# Patient Record
Sex: MALE | Race: WHITE | Employment: FULL TIME | URBAN - METROPOLITAN AREA
[De-identification: names, ages, dates, MRNs, and addresses within clinical notes are randomized per-mention and may not be internally consistent; named-entity substitution may affect disease eponyms.]

---

## 2017-01-13 ENCOUNTER — HOSPITAL ENCOUNTER (OUTPATIENT)
Dept: ONCOLOGY | Age: 62
Discharge: HOME | End: 2017-01-13
Attending: INTERNAL MEDICINE | Admitting: INTERNAL MEDICINE

## 2017-01-13 VITALS
RESPIRATION RATE: 18 BRPM | SYSTOLIC BLOOD PRESSURE: 160 MMHG | TEMPERATURE: 98 F | DIASTOLIC BLOOD PRESSURE: 97 MMHG | HEART RATE: 59 BPM

## 2017-01-13 DIAGNOSIS — D61.810 PANCYTOPENIA DUE TO CHEMOTHERAPY (HCC): ICD-10-CM

## 2017-01-13 LAB
BLOOD BANK DISPENSE STATUS: NORMAL
BLOOD BANK PRODUCT CODE: NORMAL
BPU ID: NORMAL
DESCRIPTION BLOOD BANK: NORMAL

## 2017-01-13 RX ORDER — SODIUM CHLORIDE 9 MG/ML
INJECTION, SOLUTION INTRAVENOUS CONTINUOUS
Status: ACTIVE | OUTPATIENT
Start: 2017-01-13 | End: 2017-01-14

## 2017-01-13 RX ORDER — ACETAMINOPHEN 325 MG/1
650 TABLET ORAL ONCE
Status: COMPLETED | OUTPATIENT
Start: 2017-01-13 | End: 2017-01-13

## 2017-01-13 RX ORDER — HEPARIN SODIUM (PORCINE) LOCK FLUSH IV SOLN 100 UNIT/ML 100 UNIT/ML
500 SOLUTION INTRAVENOUS PRN
Status: CANCELLED | OUTPATIENT
Start: 2017-01-13

## 2017-01-13 RX ORDER — SODIUM CHLORIDE 0.9 % (FLUSH) 0.9 %
10 SYRINGE (ML) INJECTION PRN
Status: ACTIVE | OUTPATIENT
Start: 2017-01-13 | End: 2017-01-14

## 2017-01-13 RX ORDER — HEPARIN SODIUM (PORCINE) LOCK FLUSH IV SOLN 100 UNIT/ML 100 UNIT/ML
500 SOLUTION INTRAVENOUS PRN
Status: DISPENSED | OUTPATIENT
Start: 2017-01-13 | End: 2017-01-14

## 2017-01-13 RX ORDER — SODIUM CHLORIDE 9 MG/ML
INJECTION, SOLUTION INTRAVENOUS CONTINUOUS
Status: CANCELLED | OUTPATIENT
Start: 2017-01-13

## 2017-01-13 RX ORDER — ACETAMINOPHEN 325 MG/1
650 TABLET ORAL ONCE
Status: CANCELLED | OUTPATIENT
Start: 2017-01-13 | End: 2017-01-13

## 2017-01-13 RX ORDER — SODIUM CHLORIDE 0.9 % (FLUSH) 0.9 %
20 SYRINGE (ML) INJECTION PRN
Status: ACTIVE | OUTPATIENT
Start: 2017-01-13 | End: 2017-01-14

## 2017-01-13 RX ORDER — SODIUM CHLORIDE 0.9 % (FLUSH) 0.9 %
20 SYRINGE (ML) INJECTION PRN
Status: CANCELLED | OUTPATIENT
Start: 2017-01-13

## 2017-01-13 RX ORDER — SODIUM CHLORIDE 0.9 % (FLUSH) 0.9 %
10 SYRINGE (ML) INJECTION PRN
Status: CANCELLED | OUTPATIENT
Start: 2017-01-13

## 2017-01-13 RX ADMIN — SODIUM CHLORIDE: 9 INJECTION, SOLUTION INTRAVENOUS at 09:51

## 2017-01-13 RX ADMIN — ACETAMINOPHEN 650 MG: 325 TABLET ORAL at 09:49

## 2017-01-13 RX ADMIN — HEPARIN SODIUM (PORCINE) LOCK FLUSH IV SOLN 100 UNIT/ML 500 UNITS: 100 SOLUTION at 10:26

## 2017-01-13 RX ADMIN — Medication 20 ML: at 10:26

## 2017-01-13 ASSESSMENT — PAIN SCALES - GENERAL: PAINLEVEL_OUTOF10: 0

## 2017-01-30 ENCOUNTER — HOSPITAL ENCOUNTER (OUTPATIENT)
Dept: CT IMAGING | Age: 62
Discharge: HOME | End: 2017-01-30
Attending: INTERNAL MEDICINE | Admitting: INTERNAL MEDICINE

## 2017-01-30 DIAGNOSIS — C83.33 DIFFUSE LARGE B-CELL LYMPHOMA OF INTRA-ABDOMINAL LYMPH NODES (HCC): ICD-10-CM

## 2017-03-27 ENCOUNTER — HOSPITAL ENCOUNTER (OUTPATIENT)
Dept: CT IMAGING | Age: 62
Discharge: HOME | End: 2017-03-27
Attending: INTERNAL MEDICINE | Admitting: INTERNAL MEDICINE

## 2017-03-27 DIAGNOSIS — C83.33 DIFFUSE LARGE B-CELL LYMPHOMA OF INTRA-ABDOMINAL LYMPH NODES (HCC): ICD-10-CM

## 2017-03-27 DIAGNOSIS — R06.02 SHORTNESS OF BREATH: ICD-10-CM

## 2017-05-12 ENCOUNTER — HOSPITAL ENCOUNTER (OUTPATIENT)
Dept: CT IMAGING | Age: 62
Discharge: HOME | End: 2017-05-12
Attending: INTERNAL MEDICINE | Admitting: INTERNAL MEDICINE

## 2017-05-12 DIAGNOSIS — R06.02 SHORTNESS OF BREATH: ICD-10-CM

## 2017-05-12 DIAGNOSIS — C83.33 DIFFUSE LARGE B-CELL LYMPHOMA OF INTRA-ABDOMINAL LYMPH NODES (HCC): ICD-10-CM

## 2017-06-16 ENCOUNTER — HOSPITAL ENCOUNTER (OUTPATIENT)
Dept: NON INVASIVE DIAGNOSTICS | Age: 62
Discharge: HOME | End: 2017-06-16
Attending: INTERNAL MEDICINE | Admitting: INTERNAL MEDICINE

## 2017-06-16 DIAGNOSIS — R06.02 SHORTNESS OF BREATH: ICD-10-CM

## 2017-06-16 LAB
LV EF: 50 %
LVEF MODALITY: NORMAL

## 2017-07-19 ENCOUNTER — HOSPITAL ENCOUNTER (OUTPATIENT)
Dept: CT IMAGING | Age: 62
Discharge: HOME | End: 2017-07-19
Attending: INTERNAL MEDICINE | Admitting: INTERNAL MEDICINE

## 2017-07-19 DIAGNOSIS — C83.33 DIFFUSE LARGE B-CELL LYMPHOMA OF INTRA-ABDOMINAL LYMPH NODES (HCC): ICD-10-CM

## 2017-07-19 DIAGNOSIS — R06.02 SHORTNESS OF BREATH: ICD-10-CM

## 2017-09-11 ENCOUNTER — TELEPHONE (OUTPATIENT)
Dept: CT IMAGING | Age: 62
End: 2017-09-11

## 2017-09-13 ENCOUNTER — HOSPITAL ENCOUNTER (OUTPATIENT)
Dept: CT IMAGING | Age: 62
Discharge: HOME | End: 2017-09-08

## 2017-09-13 DIAGNOSIS — R06.02 SHORTNESS OF BREATH: ICD-10-CM

## 2017-09-13 DIAGNOSIS — C83.33 DIFFUSE LARGE B-CELL LYMPHOMA OF INTRA-ABDOMINAL LYMPH NODES (HCC): ICD-10-CM

## 2017-09-27 ENCOUNTER — HOSPITAL ENCOUNTER (OUTPATIENT)
Dept: CT IMAGING | Age: 62
Discharge: HOME | End: 2017-09-27
Attending: INTERNAL MEDICINE | Admitting: INTERNAL MEDICINE

## 2017-09-27 DIAGNOSIS — C83.33 DIFFUSE LARGE B-CELL LYMPHOMA OF INTRA-ABDOMINAL LYMPH NODES (HCC): ICD-10-CM

## 2017-10-30 ENCOUNTER — HOSPITAL ENCOUNTER (OUTPATIENT)
Dept: VASCULAR LAB | Age: 62
Discharge: HOME | End: 2017-10-30
Attending: STUDENT IN AN ORGANIZED HEALTH CARE EDUCATION/TRAINING PROGRAM | Admitting: STUDENT IN AN ORGANIZED HEALTH CARE EDUCATION/TRAINING PROGRAM

## 2017-10-30 DIAGNOSIS — M79.605 PAIN AND SWELLING OF LEFT LOWER EXTREMITY: ICD-10-CM

## 2017-10-30 DIAGNOSIS — R60.0 EDEMA OF LEFT LOWER EXTREMITY: Primary | ICD-10-CM

## 2017-10-30 DIAGNOSIS — M79.89 PAIN AND SWELLING OF LEFT LOWER EXTREMITY: ICD-10-CM

## 2017-11-14 ENCOUNTER — HOSPITAL ENCOUNTER (OUTPATIENT)
Dept: CT IMAGING | Age: 62
Discharge: HOME | End: 2017-11-14
Attending: INTERNAL MEDICINE | Admitting: INTERNAL MEDICINE

## 2017-11-14 DIAGNOSIS — C83.33 DIFFUSE HISTIOCYTIC NODULAR LYMPHOMA OF ABDOMEN (HCC): ICD-10-CM

## 2017-11-14 DIAGNOSIS — C83.33 DIFFUSE LARGE B-CELL LYMPHOMA OF INTRA-ABDOMINAL LYMPH NODES (HCC): ICD-10-CM

## 2017-11-29 ENCOUNTER — TELEPHONE (OUTPATIENT)
Dept: SURGERY | Age: 62
End: 2017-11-29

## 2017-11-29 NOTE — TELEPHONE ENCOUNTER
Received Port Request from 's office needed on 12/7/17. Called patient, spoke to patient's spouse to schedule Port Placement for [facility] 12/7/17 to arrive @ 10:30am main entrance [facility]. Gave all verbal instructions. Medications to be stopped 5 days before surgery: Xarelto, patient is aware and will call  for instructions on stopping. Faxed confirmation letter to 's office.

## 2017-12-05 NOTE — PROGRESS NOTES
the order may or may not be in effect during this procedure. I give my doctor permission to give me blood or blood products. I understand that there are risks with receiving blood such as hepatitis, AIDS, fever, or allergic reaction. I acknowledge that the risks, benefits, and alternatives of this treatment have been explained to me and that no express or implied warranty has been given by the hospital, any blood bank, or any person or entity as to the blood or blood components transfused. At the discretion of my doctor, I agree to allow observers, equipment/product representatives and allow photographing, and/or televising of the procedure, provided my name or identity is maintained confidentially. I agree the hospital may dispose of or use for scientific or educational purposes any tissue, fluid, or body parts which may be removed.     ________________________________Date________Time______ am/pm  ([facility])  Patient or Signature of Closest Relative or Legal Guardian    ________________________________Date________Time______am/pm      Page 1 of  1  Witness

## 2017-12-06 ENCOUNTER — HOSPITAL ENCOUNTER (OUTPATIENT)
Dept: PREADMISSION TESTING | Age: 62
Discharge: HOME | End: 2017-12-06
Admitting: SURGERY

## 2017-12-06 VITALS — HEIGHT: 70 IN | BODY MASS INDEX: 32.64 KG/M2 | WEIGHT: 228 LBS

## 2017-12-06 RX ORDER — CHLORHEXIDINE GLUCONATE 0.12 MG/ML
15 RINSE ORAL 2 TIMES DAILY
Status: CANCELLED | OUTPATIENT
Start: 2017-12-06

## 2017-12-06 RX ORDER — CHLORHEXIDINE GLUCONATE 0.12 MG/ML
15 RINSE ORAL 2 TIMES DAILY
Status: CANCELLED | OUTPATIENT
Start: 2017-12-06 | End: 2017-12-07

## 2017-12-06 NOTE — ANESTHESIA PRE-OP
ANESTHESIA PRE-OP NOTE    NAME: Melanie Madden  : 1955  AGE: 60 y.o.  MED. REC. #: [id number]  DOS: 17  TIME: 12:30     PROCEDURE: INSERTION SINGLE LUMEN PORT-A-CATHETER   SURGEON: [place] Nena     ALLERGIES: Lansoprazole and Penicillins     HT: 5'10\"   WT: 220 lbs    MEDICATIONS:   ACYCLOVIR (ZOVIRAX) 800 MG TABLET   DIPHENHYDRAMINE (BENADRYL) 25 MG TABLET   DOCUSATE SODIUM (COLACE) 100 MG CAPSULE   HYDROCORTISONE  RECTAL CREAM   ONDANSETRON (ZOFRAN) 4 MG TABLET   OXYCODONE   PROCHLORPERAZINE (COMPAZINE)   RANITIDINE (ZANTAC) 150 MG TABLET   RIVAROXABAN (XARELTO)-->LAST DOSE: 17   SENNA (SENOKOT) 8.6 MG TABS TABLET   SULFAMETHOXAZOLE-TRIMETHOPRIM (BACTRIM)    ASA STATUS: 3  NPO since: midnight   Patient identified/chart reviewed: yes     PSH:   Appendectomy; Chaplin tooth extraction; Abdomen surgery; Colonoscopy; and fracture surgery. PERSONAL/FAMILY ANESTHESIA PROBLEMS: ( - )       CV: HTN; ECHO (2017)-->EF=55% PULMONARY: POSSIBLE SLEEP APNEA-->SNORES    GI/HEPATIC: MILD GERD  ENDOCRINE: ( - ) DM   NEURO/PSYCH: CONGENITAL PYLORIC STENOSIS  (SURGICALLY REPAIRED) : H/O RENAL DYSFUNCTION--> BUN / CREATININE ESSENTIALLY NORMAL (OCT. 2017)   MUSCULOSKELETAL: LOW BACK PAIN OTHER: HYPERCHOLESTEROLEMIA; H/O DVT & INFERIOR VENA CAVAL THROMBOSIS   HEME/ONC: LYMPHOMA; HCT=39.2 AND PLATELETS 976 K (OCT. 2017) COMMENTS:     AIRWAY ASSESSMENT: POSSIBLE SLEEP APNEA / RETROGNATHIC  MALLAMPATI: 3 DENTITION: INTACT ROM: FULL     ANESTHETIC PLAN: MAC with IV induction (general prn)   CONSENT: Risks/benefits/options/questions discussed with patient and/or patient representative. Consent obtained: Justo Boateng M.D.  2017  11:51 AM

## 2017-12-07 ENCOUNTER — HOSPITAL ENCOUNTER (OUTPATIENT)
Dept: SURGERY | Age: 62
Discharge: HOME | End: 2017-12-07
Attending: SURGERY | Admitting: SURGERY

## 2017-12-07 VITALS
HEART RATE: 57 BPM | OXYGEN SATURATION: 98 % | WEIGHT: 228 LBS | HEIGHT: 70 IN | SYSTOLIC BLOOD PRESSURE: 135 MMHG | DIASTOLIC BLOOD PRESSURE: 85 MMHG | TEMPERATURE: 97.7 F | BODY MASS INDEX: 32.64 KG/M2 | RESPIRATION RATE: 16 BRPM

## 2017-12-07 DIAGNOSIS — Z95.828 PORTACATH IN PLACE: ICD-10-CM

## 2017-12-07 PROCEDURE — 36561 INSERT TUNNELED CV CATH: CPT | Performed by: SURGERY

## 2017-12-07 PROCEDURE — 77001 FLUOROGUIDE FOR VEIN DEVICE: CPT | Performed by: SURGERY

## 2017-12-07 RX ORDER — DIPHENHYDRAMINE HYDROCHLORIDE 50 MG/ML
INJECTION INTRAMUSCULAR; INTRAVENOUS
Status: DISCONTINUED
Start: 2017-12-07 | End: 2017-12-08 | Stop reason: HOSPADM

## 2017-12-07 RX ORDER — ONDANSETRON 2 MG/ML
4 INJECTION INTRAMUSCULAR; INTRAVENOUS
Status: ACTIVE | OUTPATIENT
Start: 2017-12-07 | End: 2017-12-07

## 2017-12-07 RX ORDER — FENTANYL CITRATE 50 UG/ML
25 INJECTION, SOLUTION INTRAMUSCULAR; INTRAVENOUS EVERY 5 MIN PRN
Status: DISCONTINUED | OUTPATIENT
Start: 2017-12-07 | End: 2017-12-08 | Stop reason: HOSPADM

## 2017-12-07 RX ORDER — SODIUM CHLORIDE 0.9 % (FLUSH) 0.9 %
10 SYRINGE (ML) INJECTION EVERY 12 HOURS SCHEDULED
Status: DISCONTINUED | OUTPATIENT
Start: 2017-12-07 | End: 2017-12-08 | Stop reason: HOSPADM

## 2017-12-07 RX ORDER — SODIUM CHLORIDE 0.9 % (FLUSH) 0.9 %
10 SYRINGE (ML) INJECTION PRN
Status: DISCONTINUED | OUTPATIENT
Start: 2017-12-07 | End: 2017-12-08 | Stop reason: HOSPADM

## 2017-12-07 RX ORDER — LABETALOL HYDROCHLORIDE 5 MG/ML
5 INJECTION, SOLUTION INTRAVENOUS EVERY 5 MIN PRN
Status: DISCONTINUED | OUTPATIENT
Start: 2017-12-07 | End: 2017-12-08 | Stop reason: HOSPADM

## 2017-12-07 RX ORDER — DIPHENHYDRAMINE HYDROCHLORIDE 50 MG/ML
25 INJECTION INTRAMUSCULAR; INTRAVENOUS ONCE
Status: COMPLETED | OUTPATIENT
Start: 2017-12-07 | End: 2017-12-07

## 2017-12-07 RX ORDER — HYDRALAZINE HYDROCHLORIDE 20 MG/ML
5 INJECTION INTRAMUSCULAR; INTRAVENOUS EVERY 5 MIN PRN
Status: DISCONTINUED | OUTPATIENT
Start: 2017-12-07 | End: 2017-12-08 | Stop reason: HOSPADM

## 2017-12-07 RX ORDER — SODIUM CHLORIDE, SODIUM LACTATE, POTASSIUM CHLORIDE, CALCIUM CHLORIDE 600; 310; 30; 20 MG/100ML; MG/100ML; MG/100ML; MG/100ML
INJECTION, SOLUTION INTRAVENOUS CONTINUOUS
Status: DISCONTINUED | OUTPATIENT
Start: 2017-12-07 | End: 2017-12-08 | Stop reason: HOSPADM

## 2017-12-07 RX ORDER — SODIUM CHLORIDE, SODIUM LACTATE, POTASSIUM CHLORIDE, CALCIUM CHLORIDE 600; 310; 30; 20 MG/100ML; MG/100ML; MG/100ML; MG/100ML
125 INJECTION, SOLUTION INTRAVENOUS CONTINUOUS
Status: DISCONTINUED | OUTPATIENT
Start: 2017-12-07 | End: 2017-12-08 | Stop reason: HOSPADM

## 2017-12-07 RX ORDER — LIDOCAINE HYDROCHLORIDE 10 MG/ML
1 INJECTION, SOLUTION EPIDURAL; INFILTRATION; INTRACAUDAL; PERINEURAL
Status: ACTIVE | OUTPATIENT
Start: 2017-12-07 | End: 2017-12-07

## 2017-12-07 RX ADMIN — DIPHENHYDRAMINE HYDROCHLORIDE 25 MG: 50 INJECTION INTRAMUSCULAR; INTRAVENOUS at 13:26

## 2017-12-07 RX ADMIN — HYDRALAZINE HYDROCHLORIDE 5 MG: 20 INJECTION INTRAMUSCULAR; INTRAVENOUS at 14:12

## 2017-12-07 RX ADMIN — SODIUM CHLORIDE, SODIUM LACTATE, POTASSIUM CHLORIDE, CALCIUM CHLORIDE 125 ML/HR: 600; 310; 30; 20 INJECTION, SOLUTION INTRAVENOUS at 11:04

## 2017-12-07 ASSESSMENT — PAIN SCALES - GENERAL: PAINLEVEL_OUTOF10: 0

## 2017-12-07 ASSESSMENT — PAIN - FUNCTIONAL ASSESSMENT: PAIN_FUNCTIONAL_ASSESSMENT: 0-10

## 2017-12-07 NOTE — PROGRESS NOTES
PACU Transfer to [facility]    Vitals:    12/07/17 1430   BP: (!) 157/86   Pulse: 63   Resp: 15   Temp: 97 °F (36.1 °C)   SpO2: 96%         Intake/Output Summary (Last 24 hours) at 12/07/17 1440  Last data filed at 12/07/17 1435   Gross per 24 hour   Intake              739 ml   Output                0 ml   Net              739 ml       Pain assessment:  none  Pain Level: 0    Patient transferred to care of [facility] RN.    12/7/2017 2:40 PM

## 2017-12-07 NOTE — BRIEF OP NOTE
Brief Postoperative Note    Lum Oyster  YOB: 1955  [id number]    Pre-operative Diagnosis: Diffuse large B cell lymphoma     Post-operative Diagnosis: Same    Procedure: insertion of left subclavian single lumen port a catheter under fluoroscopic guidance     Anesthesia: MAC    Surgeons/Assistants: Dr. Rob Trinh     Estimated Blood Loss: less than 50     Complications: None    Specimens: Was Not Obtained      Electronically signed by Addis Cottrell MD on 12/7/2017 at 1:02 -2547

## 2017-12-07 NOTE — H&P
Rachele Joyce    [id number]    [facility] Same Day Surgery Update H & P  Department of General Surgery   Surgical Service   CNP Pre-operative History and Physical  Last H & P within the last 30 days. DIAGNOSIS:   B-CELL LYMPHOMA    PROCEDURE:  Insertion Single Lumen Port-A-Cath ( Leave Accessed)      HISTORY OF PRESENT ILLNESS: Pt. Is a 60 y.o. Male who is here for surgical intervention for Insertion Single Lumen Port-A-Cath for Central Venous Blood access for chemotherapy treatment of B-Cell Lymphoma. Please see initial H & P     Past Medical History:        Diagnosis Date    Allergic     Cancer ([address] 2015, 2017    Lymphoma    Chronic kidney disease     Disease of blood and blood forming organ     History of blood transfusion     Hx of blood clots 04/2015    groin    Hyperlipidemia     Hypertension     Pyloric stenosis, congenital     as a baby - surgically repaired     Past Surgical History:        Procedure Laterality Date    ABDOMEN SURGERY      pyloric stenosis     APPENDECTOMY      COLONOSCOPY      FRACTURE SURGERY      broken heal    WISDOM TOOTH EXTRACTION       Past Social History:  Social History     Social History    Marital status:      Spouse name: Noralyn Cousin    Number of children: 2    Years of education: N/A     Occupational History    [address] [place] [address]     Social History Main Topics    Smoking status: Never Smoker    Smokeless tobacco: Never Used    Alcohol use Yes      Comment: socially    Drug use: No    Sexual activity: Not Currently     Partners: Female      Comment:  - monogamous     Other Topics Concern    None     Social History Narrative    None         Medications Prior to Admission:      Prior to Admission medications    Medication Sig Start Date End Date Taking? Authorizing Provider   oxyCODONE (OXYCONTIN) 20 MG extended release tablet Take 1 tablet by mouth every 12 hours .  Earliest Fill Date: 8/5/17 8/5/17  Yes

## 2017-12-07 NOTE — PROGRESS NOTES
Ambulatory Surgery/Procedure Discharge Note    Vitals:    12/07/17 1538   BP: 135/85   Pulse:    Resp:    Temp:    SpO2:        In: -   Out: 300 [Urine:300]    Pain assessment:  None  Port a cath is accessed, Patient to see oncologist tomorrow. Pain Level: 0    Patient discharged to home/self care.  Patient discharged via wheel chair by transporter to waiting family/S.O.       12/7/2017 3:43 PM

## 2017-12-07 NOTE — PROGRESS NOTES
Pt admitted to PACU 9 from OR post INSERTION SINGLE LUMEN PORT-A-CATH LEFT SUBCLAVIAN ([facility]) per Dr. Natacha Huang. Arrives in stable condition. PACU monitoring devices in place. Report received at bedside from CRNA, no intraoperative complications. Pt denies pain. Complaining of itching. Order obtained for benadryl.

## 2017-12-07 NOTE — PROGRESS NOTES
prior to IV insertion, hair clipped at surgical site if needed  [x] Pre op antibiotic, if ordered    Patient Safety  [x] Patient identification  [x] Site verification (See Universal Protocol Checklist)  [x] General Safety precautions  [x] Side rails up, bed/stretcher in low position, wheels locked  [x] Call light within reach  [x] Patient instructed to call for assistance prior to getting out of bed    Instructions - Discharge planning for Outpatients  [x] Patient / significant other voices understanding of home care and follow up procedures.   Anticipated Special Needs upon discharge:  [] Cooling device  [] Crutches  [] Walker  [] Wound Support device   []Drain  []Other   [] See [facility] Ambulatory Procedure Discharge Instructions    Instructions - Discharge planning for Admitted Patients  [] Patient / Significant other understands plan for admission after surgery  [x] Patient / Significant other understands plan for anticipated discharge disposition                   12/7/2017 10:54 AM

## 2017-12-07 NOTE — PROGRESS NOTES
[address] PACU Education and Care Plan Goals  The following items will be achieved upon completion of the patient's transfer or discharge from the PACU:    Post Operative Pain Management                                                                               [x] Patient will verbalize understanding of pain scale and pain management. [x] Patient achieves predetermined pain goal of 4  [x] Self reports a comfort level acceptable for discharge  [] Other     Fall Risk Potential  [x] Due to Perioperative medication administration  Additional Risk Identified:   [] Sensory deficit         [] Motor deficit         [] Balance problem         [] Home medication         [] Uses assistive device to ambulate    Goal(s) for fall prevention:  [x] Prevent fall or injury by calling for assistance with activity and use of siderails while hospitalized  [x] Prevent fall or injury by using assistance with activity after discharge. [x] Patient / Significant other verbalize understanding in use of any ordered assistive devices    Mobility Safety/ ADL  [x] Reach a functional mobility goal within limitations of the procedure. Infection Precautions                                                                                                            []Patient understands implementation of infection precautions (see [facility] Presurgical Instructions and SSI Prevention Handout)    Post operative Assessment and Care                                                             [x] Standards of care met as delineated by [facility].                                                               Discharge Education and Goals  [x]Patient voices understanding of PACU discharge criteria  [x]Outpatient / significant other voices understanding of home care and follow up procedures (See [facility] Procedure Discharge Instructions)  [x] Patient / significant other understanding of Special Needs:  [] Cooling device  []Wound Support Device  [] Crutches   []Drain    [] Ana Maria Kaplan   []Other  [] Inpatient / significant other understands the plan for transfer to the inpatient unit

## 2017-12-08 NOTE — OP NOTE
[facility] [address] [facility]                                 OPERATIVE REPORT    PATIENT NAME: Fuad Lau                       :        1955  MED REC NO:   [id number]                          ROOM:  ACCOUNT NO:   [de-identified]                          ADMIT DATE: 2017  PROVIDER:     Gricelda Barba MD    DATE OF PROCEDURE:  2017    PREOPERATIVE DIAGNOSIS:  Diffuse large B-cell lymphoma. POSTOPERATIVE DIAGNOSIS:  Diffuse large B-cell lymphoma. PROCEDURE PERFORMED:  Insertion of left subclavian single-lumen Port-A-Cath  catheter under fluoroscopic guidance. COMPLICATIONS:  None. SPECIMEN:  None. INDICATIONS FOR THE OPERATION:  This is a 50-year-old male who was  diagnosed with diffuse large B-cell lymphoma, requiring a Port-A-Cath  insertion for chemotherapy administration. The details of the operation  were explained to the patient and the patient willingly consented. DETAILS OF THE OPERATION:  The patient was brought to the operating room  and placed on the operating room table in the supine position. Once on the  operating room table, a scapular towel roll was placed. MAC anesthetic was administered. Once  proper anesthesia was administered, the patient was prepped and draped in  sterile fashion. At that time, 0.5% Marcaine plain was used to anesthetize  the area of projected cannulization of the left subclavian vein. A total  of 3 mL of 0.5% Marcaine were used. The left subclavian vein was then  cannulated without difficulty and a guidewire was passed through the  cavoatrial junction and was noted to be in proper position under  fluoroscopic guidance. At that time, 0.5% Marcaine was then injected into  the area of projected pocket for the port to cath. An 11 blade was then  used to make a 3-cm incision at the area of the projected pocket as well as  around the guidewire.   Using

## 2018-01-02 ENCOUNTER — HOSPITAL ENCOUNTER (OUTPATIENT)
Dept: CT IMAGING | Age: 63
Discharge: HOME | End: 2018-01-02
Attending: INTERNAL MEDICINE | Admitting: INTERNAL MEDICINE

## 2018-01-02 DIAGNOSIS — C83.33 DIFFUSE HISTIOCYTIC NODULAR LYMPHOMA OF ABDOMEN (HCC): ICD-10-CM

## 2018-01-02 DIAGNOSIS — C83.33 DIFFUSE LARGE B-CELL LYMPHOMA OF INTRA-ABDOMINAL LYMPH NODES (HCC): ICD-10-CM

## 2018-01-02 RX ORDER — SODIUM CHLORIDE 0.9 % (FLUSH) 0.9 %
10 SYRINGE (ML) INJECTION PRN
Status: DISCONTINUED | OUTPATIENT
Start: 2018-01-02 | End: 2018-01-03 | Stop reason: HOSPADM

## 2018-01-02 RX ORDER — HEPARIN SODIUM (PORCINE) LOCK FLUSH IV SOLN 100 UNIT/ML 100 UNIT/ML
500 SOLUTION INTRAVENOUS ONCE
Status: COMPLETED | OUTPATIENT
Start: 2018-01-02 | End: 2018-01-02

## 2018-01-02 RX ADMIN — HEPARIN SODIUM (PORCINE) LOCK FLUSH IV SOLN 100 UNIT/ML 500 UNITS: 100 SOLUTION at 09:15

## 2018-01-02 RX ADMIN — Medication 10 ML: at 09:13

## 2018-01-02 RX ADMIN — Medication 10 ML: at 09:15

## 2018-02-14 ENCOUNTER — HOSPITAL ENCOUNTER (OUTPATIENT)
Dept: CT IMAGING | Age: 63
Discharge: HOME | End: 2018-02-14
Attending: INTERNAL MEDICINE | Admitting: INTERNAL MEDICINE

## 2018-02-14 DIAGNOSIS — C83.33 DIFFUSE HISTIOCYTIC NODULAR LYMPHOMA OF ABDOMEN (HCC): ICD-10-CM

## 2018-02-14 DIAGNOSIS — C83.33 DIFFUSE LARGE B-CELL LYMPHOMA OF INTRA-ABDOMINAL LYMPH NODES (HCC): ICD-10-CM

## 2018-02-14 RX ORDER — HEPARIN SODIUM (PORCINE) LOCK FLUSH IV SOLN 100 UNIT/ML 100 UNIT/ML
100 SOLUTION INTRAVENOUS ONCE
Status: DISCONTINUED | OUTPATIENT
Start: 2018-02-14 | End: 2018-02-15 | Stop reason: HOSPADM

## 2018-03-06 PROBLEM — R14.0 ABDOMINAL DISTENSION: Status: ACTIVE | Noted: 2018-03-06

## 2018-10-11 NOTE — PROGRESS NOTES
PRE-OP INSTRUCTIONS FOR THE SURGICAL PATIENT YOU ARE UNABLE TO MAKE CONTACT FOR AN INTERVIEW:      1. Follow instructions for your ARRIVAL TIME as DIRECTED BY YOUR SURGEON. 2. Enter the MAIN entrance located on [address] and report to the desk. 3. Bring your insurance & prescription card and photo ID with you. You may also be asked to pay a co-pay, as you may want to bring a check or credit card with you. 4. Leave all other valuables at home. 5. Arrange for someone to drive you home and be with you for the first 24 hours after discharge. 6. You must contact your surgeon for ALL medication instructions, especially if taking blood thinners, aspirin, or diabetic medication. 7. A Pre-op History and Physical for surgery MUST be completed by your Physician or an Urgent Care within 30 days of your procedure date. Please bring a copy with you on the day of your procedure and along with any other testing performed. 8. DO NOT EAT OR DRINK ANYTHING AFTER MIDNIGHT, including gum, candy, mints or ice chips   9. Dress in loose, comfortable clothing appropriate for redressing after your procedure. Do not wear jewelry (including body piercings), make-up, fingernail polish, lotion, powders or metal hairclips. Contacts will need to be removed prior to surgery. 10. If you use a CPAP, please bring it with you on the day of your procedure. 11. Do not shave or wax for 72 hours prior to procedure near your operative site  12. FOR WOMAN OF CHILDBEARING AGE ONLY- please bring a urine sample with you on day of surgery or make sure we can collect on arrival.    If you have further questions, you may contact us at [phone]    Left instructions on patient's voicemail. Yaya Morgan. 12/6/2017 .10:23 AM    We have  H&p and Labs!   Left message  For pt regarding to follow surgeon instruction re stopping xarelto fivee days before surgery no